# Patient Record
Sex: MALE | Race: WHITE | NOT HISPANIC OR LATINO | Employment: PART TIME | URBAN - METROPOLITAN AREA
[De-identification: names, ages, dates, MRNs, and addresses within clinical notes are randomized per-mention and may not be internally consistent; named-entity substitution may affect disease eponyms.]

---

## 2017-02-27 ENCOUNTER — OFFICE VISIT (OUTPATIENT)
Dept: MEDICAL GROUP | Facility: MEDICAL CENTER | Age: 32
End: 2017-02-27
Attending: FAMILY MEDICINE
Payer: COMMERCIAL

## 2017-02-27 VITALS
HEART RATE: 88 BPM | BODY MASS INDEX: 26.37 KG/M2 | OXYGEN SATURATION: 96 % | SYSTOLIC BLOOD PRESSURE: 110 MMHG | RESPIRATION RATE: 16 BRPM | HEIGHT: 73 IN | TEMPERATURE: 97.6 F | WEIGHT: 199 LBS | DIASTOLIC BLOOD PRESSURE: 64 MMHG

## 2017-02-27 DIAGNOSIS — H93.93: ICD-10-CM

## 2017-02-27 PROCEDURE — 99213 OFFICE O/P EST LOW 20 MIN: CPT | Performed by: FAMILY MEDICINE

## 2017-02-27 PROCEDURE — 99212 OFFICE O/P EST SF 10 MIN: CPT | Mod: 25 | Performed by: FAMILY MEDICINE

## 2017-02-27 PROCEDURE — 69209 REMOVE IMPACTED EAR WAX UNI: CPT | Performed by: FAMILY MEDICINE

## 2017-02-27 NOTE — MR AVS SNAPSHOT
"        Parmjit Thomas   2017 4:10 PM   Office Visit   MRN: [id number]    [facility]   Dept Phone:  [phone]    Description:  Male : 1985   Provider:  London Nichole M.D.           Reason for Visit     Cerumen Impaction bilateral      Allergies as of 2017     Allergen Noted Reactions    Sulfa Drugs 2015   Rash    Pt states he breaks out into a rash    Abilify 10/16/2015       Severe anxiety and stimulation    Sulfa Drugs 2016         You were diagnosed with     Ear disorder, bilateral   [[id number]]         Vital Signs     Blood Pressure Pulse Temperature Respirations Height Weight    110/64 mmHg 88 36.4 °C (97.6 °F) 16 1.854 m (6' 1\") 90.266 kg (199 lb)    Body Mass Index Oxygen Saturation Smoking Status             26.26 kg/m2 96% Never Smoker          Basic Information     Date Of Birth Sex Race Ethnicity Preferred Language    1985 Male White Non- English      Problem List              ICD-10-CM Priority Class Noted - Resolved    History of tear of ACL (anterior cruciate ligament) Z87.828   2015 - Present    ADD (attention deficit disorder) F98.8   2015 - Present    OCD (obsessive compulsive disorder) F42.9   2015 - Present    Severe depression (CMS-HCC) F32.2   2015 - Present    Left knee pain M25.562   2015 - Present    History of methamphetamine abuse Z87.898   2015 - Present    Recurrent nephrolithiasis N20.0   2015 - Present    Anxiety F41.9   10/16/2015 - Present    Right ankle sprain S93.401A   2016 - Present    Closed fracture of sternum S22.20XA   2016 - Present    Closed compression fracture of L5 lumbar vertebra (CMS-HCC) S32.050A   3/10/2016 - Present      Health Maintenance        Date Due Completion Dates    IMM DTaP/Tdap/Td Vaccine (1 - Tdap) 10/7/2005 ---    IMM INFLUENZA (1) 2016 ---            Current Immunizations     No immunizations on file.      Below and/or attached are " the medications your provider expects you to take. Review all of your home medications and newly ordered medications with your provider and/or pharmacist. Follow medication instructions as directed by your provider and/or pharmacist. Please keep your medication list with you and share with your provider. Update the information when medications are discontinued, doses are changed, or new medications (including over-the-counter products) are added; and carry medication information at all times in the event of emergency situations     Allergies:  SULFA DRUGS - Rash     ABILIFY - (reactions not documented)     SULFA DRUGS - (reactions not documented)               Medications  Valid as of: February 27, 2017 -  4:47 PM    Generic Name Brand Name Tablet Size Instructions for use    BuPROPion HCl (TABLET SR 24 HR) WELLBUTRIN  MG Take 1 Tab by mouth every morning.        ClonazePAM (Tab) KLONOPIN 1 MG Take 1 Tab by mouth 2 times a day as needed.        Diclofenac Sodium (Tablet Delayed Response) VOLTAREN 75 MG TAKE 1 TAB BY MOUTH 2 TIMES A DAY.        Gabapentin (Cap) NEURONTIN 300 MG Take 600 mg by mouth 2 Times a Day.        Gabapentin (Tab) NEURONTIN 600 MG Take 1 Tab by mouth 2 times a day.        OxyCODONE HCl (Tab) ROXICODONE 5 MG Take 1 Tab by mouth every four hours as needed for Severe Pain.        .                 Medicines prescribed today were sent to:     [id number] [place] - [address] [facility].    [address] [place] [address]    Phone: [phone] Fax: [phone]    Open 24 Hours?: No    [id number] [place] - [address] [address]    Phone: [phone] Fax: [phone]    Open 24 Hours?: No      Medication refill instructions:       If your prescription bottle indicates you have medication refills left, it is not necessary to call your provider’s office. Please contact your pharmacy and they will refill your  medication.    If your prescription bottle indicates you do not have any refills left, you may request refills at any time through one of the following ways: The online [employer] system (except Urgent Care), by calling your provider’s office, or by asking your pharmacy to contact your provider’s office with a refill request. Medication refills are processed only during regular business hours and may not be available until the next business day. Your provider may request additional information or to have a follow-up visit with you prior to refilling your medication.   *Please Note: Medication refills are assigned a new Rx number when refilled electronically. Your pharmacy may indicate that no refills were authorized even though a new prescription for the same medication is available at the pharmacy. Please request the medicine by name with the pharmacy before contacting your provider for a refill.           [facility] Access Code: [email or web address]  Expires: 3/29/2017  4:47 PM    [facility]  A secure, online tool to manage your health information     [employer]® is a secure, online tool that connects you to your personalized health information from the privacy of your home -- day or night - making it very easy for you to manage your healthcare. Once the activation process is completed, you can even access your medical information using the [facility] axel, which is available for free in the Apple Axel store or Google Play store.     [facility] provides the following levels of access (as shown below):   My Chart Features   [facility] Doctor [facility]  Specialists [facility]  Urgent  Care [facility]  Primary Care  Doctor   Email your healthcare team securely and privately 24/7 X X X    Manage appointments: schedule your next appointment; view details of past/upcoming appointments X      Request prescription refills. X      View recent personal medical records, including lab and immunizations X X X X   View health  record, including health history, allergies, medications X X X X   Read reports about your outpatient visits, procedures, consult and ER notes X X X X   See your discharge summary, which is a recap of your hospital and/or ER visit that includes your diagnosis, lab results, and care plan. X X       How to register for [employer]:  1. Go to  [email or web address].  2. Click on the Sign Up Now box, which takes you to the New Member Sign Up page. You will need to provide the following information:  a. Enter your [employer] Access Code exactly as it appears at the top of this page. (You will not need to use this code after you’ve completed the sign-up process. If you do not sign up before the expiration date, you must request a new code.)   b. Enter your date of birth.   c. Enter your home email address.   d. Click Submit, and follow the next screen’s instructions.  3. Create a [employer] ID. This will be your [employer] login ID and cannot be changed, so think of one that is secure and easy to remember.  4. Create a [employer] password. You can change your password at any time.  5. Enter your Password Reset Question and Answer. This can be used at a later time if you forget your password.   6. Enter your e-mail address. This allows you to receive e-mail notifications when new information is available in [employer].  7. Click Sign Up. You can now view your health information.    For assistance activating your [employer] account, call [phone]

## 2017-02-28 NOTE — PROGRESS NOTES
"Subjective:      Parmjit Thomas is a 31 y.o. male who presents with Cerumen Impaction            Cerumen Impaction     1. Ear fullness-patient parts 1-2 month history of fullness and mild discomfort in both ears. He denies nasal congestion or discharge, sore throat, headache    ROS negative for ear drainage, history of past cerumen impaction, loss of balance       Objective:     /64 mmHg  Pulse 88  Temp(Src) 36.4 °C (97.6 °F)  Resp 16  Ht 1.854 m (6' 1\")  Wt 90.266 kg (199 lb)  BMI 26.26 kg/m2  SpO2 96%     Physical Exam   General- alert,cooperative patient in no acute distress  Ears-moderate amount of wax on the floor of the left ear canal obscuring about half of otherwise normal-appearing TM. Mild amount of wax at the base of the right TM otherwise clear canal. Both sites are irrigated with resolution of patient's symptoms. Left side now appears completely clear. Small amount of wax still noted at the right eardrum.  Nares- clear, pink, moist mucosa without bleeding. No purulent nasal DC  Orophx.- lips normal. Clear, pink, moist mucosa without redness or exudate. Tongue is midline  Chest-Normal to auscultation and percussion, movement is symmetric. Nontender to palpation.              Assessment/Plan:     1. Ear disorder, bilateral  Plan: 1. Patient is discouraged from using any type of a small object to trying pool wax out of either ear blindly  2. Patient is advised to return to clinic if he feels that similar symptoms are starting to develop in the future        "

## 2017-10-17 ENCOUNTER — OFFICE VISIT (OUTPATIENT)
Dept: MEDICAL GROUP | Facility: MEDICAL CENTER | Age: 32
End: 2017-10-17
Payer: COMMERCIAL

## 2017-10-17 VITALS
DIASTOLIC BLOOD PRESSURE: 70 MMHG | SYSTOLIC BLOOD PRESSURE: 118 MMHG | WEIGHT: 209 LBS | BODY MASS INDEX: 27.7 KG/M2 | RESPIRATION RATE: 18 BRPM | TEMPERATURE: 97.9 F | OXYGEN SATURATION: 95 % | HEART RATE: 74 BPM | HEIGHT: 73 IN

## 2017-10-17 DIAGNOSIS — J34.89 RHINORRHEA: ICD-10-CM

## 2017-10-17 DIAGNOSIS — J30.1 ACUTE SEASONAL ALLERGIC RHINITIS DUE TO POLLEN: ICD-10-CM

## 2017-10-17 PROCEDURE — 99214 OFFICE O/P EST MOD 30 MIN: CPT | Performed by: PHYSICIAN ASSISTANT

## 2017-10-17 RX ORDER — AZELASTINE 1 MG/ML
1 SPRAY, METERED NASAL 2 TIMES DAILY
Qty: 30 ML | Refills: 3 | Status: SHIPPED | OUTPATIENT
Start: 2017-10-17 | End: 2018-01-28

## 2017-10-17 RX ORDER — PREDNISONE 20 MG/1
TABLET ORAL
Qty: 15 TAB | Refills: 0 | Status: SHIPPED | OUTPATIENT
Start: 2017-10-17 | End: 2017-10-17 | Stop reason: SDUPTHER

## 2017-10-17 RX ORDER — PREDNISONE 20 MG/1
TABLET ORAL
Qty: 15 TAB | Refills: 0 | Status: SHIPPED | OUTPATIENT
Start: 2017-10-17 | End: 2017-11-14

## 2017-10-17 RX ORDER — AZELASTINE 1 MG/ML
1 SPRAY, METERED NASAL 2 TIMES DAILY
Qty: 30 ML | Refills: 3 | Status: SHIPPED | OUTPATIENT
Start: 2017-10-17 | End: 2017-10-17 | Stop reason: SDUPTHER

## 2017-10-17 NOTE — ASSESSMENT & PLAN NOTE
This is a 32-year-old male complains of an approximate one-month history of sinus congestion and draining. Sometimes it is worse in the morning sometimes it happens all day long. He states he's been sneezing all the time. Eyes bother him somewhat. He works in construction. He has tried every over-the-counter allergy product. Is currently on Allegra. Has tried Flonase. Uses Afrin routinely. Last year allergies weren't as bad. Denies any fevers. No shortness of breath or chest pain. No known sick contacts.

## 2017-10-17 NOTE — PROGRESS NOTES
"Subjective:   Parmjit Thomas is a 32 y.o. male here today for runny nose and sneezing for approximately one month.    Rhinorrhea  This is a 32-year-old male complains of an approximate one-month history of sinus congestion and draining. Sometimes it is worse in the morning sometimes it happens all day long. He states he's been sneezing all the time. Eyes bother him somewhat. He works in construction. He has tried every over-the-counter allergy product. Is currently on Allegra. Has tried Flonase. Uses Afrin routinely. Last year allergies weren't as bad. Denies any fevers. No shortness of breath or chest pain. No known sick contacts.       Current medicines (including changes today)  Current Outpatient Prescriptions   Medication Sig Dispense Refill   • azelastine (ASTELIN) 137 MCG/SPRAY nasal spray [place] 1 Spray in nose 2 times a day. 30 mL 3   • predniSONE (DELTASONE) 20 MG Tab Take three tablets daily x 5 days. 15 Tab 0     No current facility-administered medications for this visit.      He  has a past medical history of ADD (attention deficit disorder) (2006); Depression (age 15); Depression; Kidney stone (2005); and OCD (obsessive compulsive disorder) (2014).    ROS   No chest pain, no shortness of breath, no abdominal pain and all other systems were reviewed and are negative.       Objective:     Blood pressure 118/70, pulse 74, temperature 36.6 °C (97.9 °F), resp. rate 18, height 1.854 m (6' 1\"), weight 94.8 kg (209 lb), SpO2 95 %. Body mass index is 27.57 kg/m².   Physical Exam:  Constitutional: Alert, no distress.  Skin: Warm, dry, good turgor, no rashes in visible areas.  Eye: Equal, round and reactive, conjunctiva clear, lids normal.  ENMT: Lips without lesions, good dentition, oropharynx clear.Nasal passages with swollen erythematous turbinates. Associated clear mucus. TMs bilaterally are clear.  Neck: Trachea midline, no masses.   Lymph: No cervical or supraclavicular " lymphadenopathy  Respiratory: Unlabored respiratory effort, lungs clear to auscultation, no wheezes, no ronchi.  Cardiovascular: Normal S1, S2, no murmur, no edema.  Abdomen: Soft, non-tender, no masses.  Psych: Alert and oriented x3, normal affect and mood.        Assessment and Plan:   The following treatment plan was discussed    1. Rhinorrhea  Acute, new onset condition. Advised symptoms appear to be secondary to allergic rhinitis. Advised to wash hands routinely and not touch his face. May continue Allegra daily. Use Flonase one spray per nostril twice a day. Provided a course of prednisone for 5 days to see if it suppresses his spots. Also provided Astelin one spray in nose twice a day as well. Follow-up or contact me with any continuation of symptoms. May refer to allergy or provide Kenalog injection.  - azelastine (ASTELIN) 137 MCG/SPRAY nasal spray; Spray 1 Spray in nose 2 times a day.  Dispense: 30 mL; Refill: 3  - predniSONE (DELTASONE) 20 MG Tab; Take three tablets daily x 5 days.  Dispense: 15 Tab; Refill: 0    2. Acute seasonal allergic rhinitis due to pollen  Acute, new onset condition. Please see above.  - azelastine (ASTELIN) 137 MCG/SPRAY nasal spray; Spray 1 Spray in nose 2 times a day.  Dispense: 30 mL; Refill: 3  - predniSONE (DELTASONE) 20 MG Tab; Take three tablets daily x 5 days.  Dispense: 15 Tab; Refill: 0      Followup: Return if symptoms worsen or fail to improve.    Please note that this dictation was created using voice recognition software. I have made every reasonable attempt to correct obvious errors, but I expect that there are errors of grammar and possibly content that I did not discover before finalizing the note.

## 2017-11-14 ENCOUNTER — OFFICE VISIT (OUTPATIENT)
Dept: MEDICAL GROUP | Facility: MEDICAL CENTER | Age: 32
End: 2017-11-14
Payer: COMMERCIAL

## 2017-11-14 VITALS
RESPIRATION RATE: 16 BRPM | BODY MASS INDEX: 28.43 KG/M2 | DIASTOLIC BLOOD PRESSURE: 72 MMHG | HEIGHT: 73 IN | OXYGEN SATURATION: 93 % | WEIGHT: 214.51 LBS | SYSTOLIC BLOOD PRESSURE: 132 MMHG | TEMPERATURE: 98.9 F | HEART RATE: 80 BPM

## 2017-11-14 DIAGNOSIS — Z71.1 CONCERN ABOUT STD IN MALE WITHOUT DIAGNOSIS: ICD-10-CM

## 2017-11-14 DIAGNOSIS — N46.9 MALE FERTILITY PROBLEM: ICD-10-CM

## 2017-11-14 DIAGNOSIS — R21 RASH OF FACE: ICD-10-CM

## 2017-11-14 DIAGNOSIS — L60.8 NAIL DEFORMITY: ICD-10-CM

## 2017-11-14 PROCEDURE — 99214 OFFICE O/P EST MOD 30 MIN: CPT | Performed by: PHYSICIAN ASSISTANT

## 2017-11-14 RX ORDER — TRIAMCINOLONE ACETONIDE 1 MG/G
1 CREAM TOPICAL 2 TIMES DAILY
Qty: 60 G | Refills: 0 | Status: SHIPPED | OUTPATIENT
Start: 2017-11-14 | End: 2018-01-28

## 2017-11-14 RX ORDER — CICLOPIROX 80 MG/ML
SOLUTION TOPICAL
Qty: 6.6 ML | Refills: 1 | Status: SHIPPED | OUTPATIENT
Start: 2017-11-14 | End: 2018-10-15

## 2017-11-15 NOTE — PROGRESS NOTES
Subjective:   Parmjit Thomas is a 32 y.o. male here today for discussion the rash on his face and other issues.    Rash of face  This is a 32-year-old male who is here today for several complaints. I saw him last time for severe allergies. I provided him a steroid course and symptoms have improved drastically. He is also using a nasal spray. Complains of a rash on his face. He told me about it through my chart but I told her take over-the-counter steroid cream as well as use a fungal clean. He is tried both without any relief. Rashes under the left eye as well as above the right eye also nasolabial folds. Complains of slight itching.    Male fertility problem  He is currently back with his girlfriend who is 38 years of age and had 2 children. They are almost out of the house. He is not interested in children currently. Wants to discuss about a vasectomy. Is not even sure if he can have children as is never gotten anyone pregnant. Wants to check his fertility status.    Nail deformity  Complains of his right great toe with a chronic history of a nail deformity. When he was in young child there was trauma to that nail and half of it was removed. He complains now of pain of the toenails especially when wearing steel toe boots. Has used over-the-counter fungal removal without any success.    Concern about STD in male without diagnosis  Wants an STD profile. Asymptomatic today.         Current medicines (including changes today)  Current Outpatient Prescriptions   Medication Sig Dispense Refill   • triamcinolone acetonide (KENALOG) 0.1 % Cream Apply 1 Application to affected area(s) 2 times a day. 60 g 0   • ciclopirox (PENLAC) 8 % solution Apply to nail and surrounding tissue for 7 days then remove with alcohol. 6.6 mL 1   • azelastine (ASTELIN) 137 MCG/SPRAY nasal spray [place] 1 Spray in nose 2 times a day. 30 mL 3     No current facility-administered medications for this visit.      He  has a past medical  "history of ADD (attention deficit disorder) (2006); Depression (age 15); Depression; Kidney stone (2005); and OCD (obsessive compulsive disorder) (2014).    ROS   No chest pain, no shortness of breath, no abdominal pain and all other systems were reviewed and are negative.       Objective:     Blood pressure 132/72, pulse 80, temperature 37.2 °C (98.9 °F), resp. rate 16, height 1.854 m (6' 1\"), weight 97.3 kg (214 lb 8.1 oz), SpO2 93 %. Body mass index is 28.3 kg/m².   Physical Exam:  Constitutional: Alert, no distress.  Skin: Warm, dry, good turgor, erythematous raised lesions which appear to be spotted in various aspects of the face. Right toenail with some yellowish markings as well as some slight thickness.  Eye: Equal, round and reactive, conjunctiva clear, lids normal.  ENMT: Lips without lesions, good dentition, oropharynx clear.  Neck: Trachea midline, no masses.   Lymph: No cervical or supraclavicular lymphadenopathy  Respiratory: Unlabored respiratory effort, lungs clear to auscultation, no wheezes, no ronchi.  Cardiovascular: Normal S1, S2, no murmur, no edema.  Abdomen: Soft, non-tender, no masses.  Psych: Alert and oriented x3, normal affect and mood.        Assessment and Plan:   The following treatment plan was discussed    1. Rash of face  New-onset condition. Could be secondary to eczema giving his history of allergies. Provided triamcinolone as directed by sparingly twice a day for up to 5-7 days.  - triamcinolone acetonide (KENALOG) 0.1 % Cream; Apply 1 Application to affected area(s) 2 times a day.  Dispense: 60 g; Refill: 0    2. Concern about STD in male without diagnosis  Order labs. He will be contacted with the results.  - CHLAMYDIA TRACHOMATIS, NEISSERIA GONORRHEA  - HEPATITIS PANEL ACUTE (4 COMPONENTS)  - HSV II SPECIFIC IGG AB; Future  - HIV ANTIBODIES; Future    3. Male fertility problem  Orders from count. Status unknown regarding his fertility.  - SPERM COUNT, TOTAL    4. Nail " deformity  Chronic condition. Unlikely secondary to fungus but we'll try Penlac for a week or 2 to see if it improves. Contact me with any concerns to my chart.  - ciclopirox (PENLAC) 8 % solution; Apply to nail and surrounding tissue for 7 days then remove with alcohol.  Dispense: 6.6 mL; Refill: 1      Followup: Return if symptoms worsen or fail to improve.    Please note that this dictation was created using voice recognition software. I have made every reasonable attempt to correct obvious errors, but I expect that there are errors of grammar and possibly content that I did not discover before finalizing the note.

## 2017-11-15 NOTE — ASSESSMENT & PLAN NOTE
This is a 32-year-old male who is here today for several complaints. I saw him last time for severe allergies. I provided him a steroid course and symptoms have improved drastically. He is also using a nasal spray. Complains of a rash on his face. He told me about it through my chart but I told her take over-the-counter steroid cream as well as use a fungal clean. He is tried both without any relief. Rashes under the left eye as well as above the right eye also nasolabial folds. Complains of slight itching.

## 2017-11-15 NOTE — ASSESSMENT & PLAN NOTE
Complains of his right great toe with a chronic history of a nail deformity. When he was in young child there was trauma to that nail and half of it was removed. He complains now of pain of the toenails especially when wearing steel toe boots. Has used over-the-counter fungal removal without any success.

## 2017-11-15 NOTE — ASSESSMENT & PLAN NOTE
He is currently back with his girlfriend who is 38 years of age and had 2 children. They are almost out of the house. He is not interested in children currently. Wants to discuss about a vasectomy. Is not even sure if he can have children as is never gotten anyone pregnant. Wants to check his fertility status.

## 2017-12-08 DIAGNOSIS — N46.9 MALE FERTILITY PROBLEM: ICD-10-CM

## 2017-12-21 ENCOUNTER — TELEPHONE (OUTPATIENT)
Dept: MEDICAL GROUP | Facility: MEDICAL CENTER | Age: 32
End: 2017-12-21

## 2017-12-22 NOTE — TELEPHONE ENCOUNTER
Phone Number Called: [phone] (home)     Message: Notified patient of the results and he stated understanding.    Left Message for patient to call back: no

## 2017-12-22 NOTE — TELEPHONE ENCOUNTER
----- Message from Parmjit Pat P.A.-C. sent at 12/21/2017  4:20 PM PST -----  Please contact Parmjit.  Labs performed were negative.  Thank you.    Parmjit

## 2018-01-28 RX ORDER — METRONIDAZOLE 7.5 MG/G
1 GEL TOPICAL 2 TIMES DAILY
Qty: 1 TUBE | Refills: 0 | Status: SHIPPED | OUTPATIENT
Start: 2018-01-28 | End: 2018-10-15

## 2018-03-26 DIAGNOSIS — M54.9 SPINE PAIN: ICD-10-CM

## 2018-07-27 ENCOUNTER — OFFICE VISIT (OUTPATIENT)
Dept: URGENT CARE | Facility: PHYSICIAN GROUP | Age: 33
End: 2018-07-27

## 2018-07-27 VITALS
SYSTOLIC BLOOD PRESSURE: 126 MMHG | DIASTOLIC BLOOD PRESSURE: 74 MMHG | TEMPERATURE: 98.6 F | WEIGHT: 205 LBS | HEART RATE: 87 BPM | BODY MASS INDEX: 27.17 KG/M2 | RESPIRATION RATE: 16 BRPM | HEIGHT: 73 IN | OXYGEN SATURATION: 96 %

## 2018-07-27 DIAGNOSIS — R30.0 DYSURIA: ICD-10-CM

## 2018-07-27 DIAGNOSIS — R36.9 PENILE DISCHARGE: ICD-10-CM

## 2018-07-27 DIAGNOSIS — Z20.2 POSSIBLE EXPOSURE TO STD: ICD-10-CM

## 2018-07-27 PROCEDURE — 99214 OFFICE O/P EST MOD 30 MIN: CPT | Mod: 25 | Performed by: NURSE PRACTITIONER

## 2018-07-27 PROCEDURE — 96372 THER/PROPH/DIAG INJ SC/IM: CPT | Performed by: NURSE PRACTITIONER

## 2018-07-27 RX ORDER — AZITHROMYCIN 500 MG/1
1000 TABLET, FILM COATED ORAL ONCE
Qty: 2 TAB | Refills: 0 | Status: SHIPPED | OUTPATIENT
Start: 2018-07-27 | End: 2018-07-27

## 2018-07-28 NOTE — PROGRESS NOTES
Chief Complaint   Patient presents with   • Dysuria     and discharge x3 days       HISTORY OF PRESENT ILLNESS: Patient is a 32 y.o. male who presents to urgent care today with concerns of STD. States for the past three days he has had dysuria and penile discharge. He denies associated hematuria, urgency, frequency, testicular pain or swelling. Denies any fever or chills or malaise. Admits to history of gonorrhea in 2015, states this feels exactly similar. He is sexually active with concerned he may have contracted STI from partner, she is denying symptoms. He has not taken any medication for symptom relie    Patient Active Problem List    Diagnosis Date Noted   • Rash of face 11/14/2017   • Concern about STD in male without diagnosis 11/14/2017   • Male fertility problem 11/14/2017   • Nail deformity 11/14/2017   • Rhinorrhea 10/17/2017   • Acute seasonal allergic rhinitis due to pollen 10/17/2017   • Recurrent nephrolithiasis 09/11/2015   • History of tear of ACL (anterior cruciate ligament) 02/26/2015   • Left knee pain 02/26/2015   • History of methamphetamine abuse 02/26/2015       Allergies:Sulfa drugs; Abilify; and Sulfa drugs    Current Outpatient Prescriptions Ordered in [facility]   Medication Sig Dispense Refill   • azithromycin (ZITHROMAX) 500 MG tablet Take 2 Tabs by mouth Once for 1 dose. 2 Tab 0   • metronidazole (METROGEL) 0.75 % gel Apply 1 Application to affected area(s) 2 times a day. 1 Tube 0   • ciclopirox (PENLAC) 8 % solution Apply to nail and surrounding tissue for 7 days then remove with alcohol. 6.6 mL 1     Current Facility-Administered Medications Ordered in Epic   Medication Dose Route Frequency Provider Last Rate Last Dose   • cefTRIAXone (ROCEPHIN) 250 mg, lidocaine (XYLOCAINE) 1 % 0.9 mL for IM use  250 mg Intramuscular Once Karime Clark A.P.R.NLaya           Past Medical History:   Diagnosis Date   • ADD (attention deficit disorder) 2006   • Depression age 15   • Depression    • Kidney stone  "2005    lithotripsy   • OCD (obsessive compulsive disorder) 2014    seeks perfection       Social History   Substance Use Topics   • Smoking status: Never Smoker   • Smokeless tobacco: Never Used   • Alcohol use 0.0 oz/week      Comment: Occassionally       Family Status   Relation Status   • MGMo (Not Specified)   • MGFa (Not Specified)   • Neg Hx (Not Specified)     Family History   Problem Relation Age of Onset   • Stroke Maternal Grandmother    • Heart Disease Maternal Grandfather    • Alcohol/Drug Maternal Grandfather    • Cancer Neg Hx    • Diabetes Neg Hx        ROS:  Review of Systems   Constitutional: Negative for fever, chills, weight loss, malaise, and fatigue.   HENT: Negative for ear pain, nosebleeds, congestion, sore throat and neck pain.    Eyes: Negative for vision changes.   Neuro: Negative for headache, sensory changes, weakness, seizure, LOC.   Cardiovascular: Negative for chest pain, palpitations, orthopnea and leg swelling.   Respiratory: Negative for cough, sputum production, shortness of breath and wheezing.   Gastrointestinal: Negative for abdominal pain, nausea, vomiting or diarrhea.   Genitourinary: Positive for discharge, dysuria. Negative for hematuria, urgency and frequency. Negative for testicular pain, swelling.   Musculoskeletal: Negative for falls, neck pain, back pain, joint pain, myalgias.   Skin: Negative for rash, diaphoresis.     Exam:  Blood pressure 126/74, pulse 87, temperature 37 °C (98.6 °F), resp. rate 16, height 1.854 m (6' 1\"), weight 93 kg (205 lb), SpO2 96 %.  General: well-nourished, well-developed male in NAD  Head: normocephalic, atraumatic  Eyes: PERRLA, no conjunctival injection, acuity grossly intact, lids normal.  Ears: normal shape and symmetry, gross auditory acuity is intact.  Nose: symmetrical without tenderness, no discharge.  Mouth/Throat: reasonable hygiene, no erythema, exudates or tonsillar enlargement.  Neck: no masses, range of motion within normal " limits, no tracheal deviation. No obvious thyroid enlargement.   Lymph: no cervical adenopathy. No supraclavicular adenopathy.   Neuro: alert and oriented. Cranial nerves 1-12 grossly intact. No sensory deficit.   Cardiovascular: regular rate and rhythm. No edema.  Pulmonary: no distress. Chest is symmetrical with respiration, no wheezes, crackles, or rhonchi.   Abdomen: soft, non-tender, no guarding, no hepatosplenomegaly.   Musculoskeletal: no clubbing, appropriate muscle tone, gait is stable.  Skin: warm, dry, intact, no clubbing, no cyanosis, no rashes.   Psych: appropriate mood, affect, judgement.         Assessment/Plan:    1. Penile discharge  2. Dysuria  3. Possible exposure to STD        The patient is kindly refusing urine culture or STI screening. States his symptoms feel exactly similar to previous STI. Therefore, will treat proactively with azithromycin and rocephin. Tolerated rocephin well. Safe sex practices discussed.   Supportive care, differential diagnoses, and indications for immediate follow-up discussed with patient.   Pathogenesis of diagnosis discussed including typical length and natural progression.   Instructed to return to clinic or nearest emergency department for any change in condition, further concerns, or worsening of symptoms.  Patient states understanding of the plan of care and discharge instructions.  Instructed to make an appointment, for follow up, with his primary care provider.        Please note that this dictation was created using voice recognition software. I have made every reasonable attempt to correct obvious errors, but I expect that there are errors of grammar and possibly content that I did not discover before finalizing the note.      PERCY Santana.

## 2018-10-15 ENCOUNTER — HOSPITAL ENCOUNTER (EMERGENCY)
Facility: MEDICAL CENTER | Age: 33
End: 2018-10-16
Attending: EMERGENCY MEDICINE
Payer: MEDICAID

## 2018-10-15 DIAGNOSIS — N13.30 HYDRONEPHROSIS, UNSPECIFIED HYDRONEPHROSIS TYPE: ICD-10-CM

## 2018-10-15 DIAGNOSIS — R10.9 FLANK PAIN: ICD-10-CM

## 2018-10-15 DIAGNOSIS — M79.652 ACUTE PAIN OF LEFT THIGH: ICD-10-CM

## 2018-10-15 PROCEDURE — 99284 EMERGENCY DEPT VISIT MOD MDM: CPT

## 2018-10-16 ENCOUNTER — HOSPITAL ENCOUNTER (EMERGENCY)
Facility: MEDICAL CENTER | Age: 33
End: 2018-10-16
Attending: EMERGENCY MEDICINE
Payer: MEDICAID

## 2018-10-16 ENCOUNTER — APPOINTMENT (OUTPATIENT)
Dept: RADIOLOGY | Facility: MEDICAL CENTER | Age: 33
End: 2018-10-16
Attending: EMERGENCY MEDICINE
Payer: MEDICAID

## 2018-10-16 VITALS
HEART RATE: 81 BPM | SYSTOLIC BLOOD PRESSURE: 115 MMHG | WEIGHT: 202.82 LBS | BODY MASS INDEX: 26.88 KG/M2 | RESPIRATION RATE: 18 BRPM | DIASTOLIC BLOOD PRESSURE: 69 MMHG | HEIGHT: 73 IN | OXYGEN SATURATION: 95 % | TEMPERATURE: 97.6 F

## 2018-10-16 VITALS
HEIGHT: 73 IN | WEIGHT: 202.16 LBS | BODY MASS INDEX: 26.79 KG/M2 | OXYGEN SATURATION: 99 % | HEART RATE: 84 BPM | DIASTOLIC BLOOD PRESSURE: 75 MMHG | RESPIRATION RATE: 19 BRPM | TEMPERATURE: 99.1 F | SYSTOLIC BLOOD PRESSURE: 131 MMHG

## 2018-10-16 DIAGNOSIS — M79.605 LEFT LEG PAIN: ICD-10-CM

## 2018-10-16 DIAGNOSIS — M71.22 SYNOVIAL CYST OF LEFT POPLITEAL SPACE: ICD-10-CM

## 2018-10-16 LAB
ANION GAP SERPL CALC-SCNC: 7 MMOL/L (ref 0–11.9)
APPEARANCE UR: CLEAR
BASOPHILS # BLD AUTO: 0.5 % (ref 0–1.8)
BASOPHILS # BLD: 0.03 K/UL (ref 0–0.12)
BILIRUB UR QL STRIP.AUTO: NEGATIVE
BNP SERPL-MCNC: 20 PG/ML (ref 0–100)
BUN SERPL-MCNC: 13 MG/DL (ref 8–22)
CALCIUM SERPL-MCNC: 9.3 MG/DL (ref 8.4–10.2)
CHLORIDE SERPL-SCNC: 101 MMOL/L (ref 96–112)
CO2 SERPL-SCNC: 26 MMOL/L (ref 20–33)
COLOR UR: YELLOW
CREAT SERPL-MCNC: 1.05 MG/DL (ref 0.5–1.4)
EOSINOPHIL # BLD AUTO: 0.06 K/UL (ref 0–0.51)
EOSINOPHIL NFR BLD: 0.9 % (ref 0–6.9)
ERYTHROCYTE [DISTWIDTH] IN BLOOD BY AUTOMATED COUNT: 40.5 FL (ref 35.9–50)
GLUCOSE SERPL-MCNC: 98 MG/DL (ref 65–99)
GLUCOSE UR STRIP.AUTO-MCNC: NEGATIVE MG/DL
HCT VFR BLD AUTO: 45.2 % (ref 42–52)
HGB BLD-MCNC: 16.1 G/DL (ref 14–18)
IMM GRANULOCYTES # BLD AUTO: 0.01 K/UL (ref 0–0.11)
IMM GRANULOCYTES NFR BLD AUTO: 0.2 % (ref 0–0.9)
KETONES UR STRIP.AUTO-MCNC: NEGATIVE MG/DL
LEUKOCYTE ESTERASE UR QL STRIP.AUTO: NEGATIVE
LYMPHOCYTES # BLD AUTO: 1.94 K/UL (ref 1–4.8)
LYMPHOCYTES NFR BLD: 29.2 % (ref 22–41)
MCH RBC QN AUTO: 30.8 PG (ref 27–33)
MCHC RBC AUTO-ENTMCNC: 35.6 G/DL (ref 33.7–35.3)
MCV RBC AUTO: 86.6 FL (ref 81.4–97.8)
MICRO URNS: NORMAL
MONOCYTES # BLD AUTO: 0.71 K/UL (ref 0–0.85)
MONOCYTES NFR BLD AUTO: 10.7 % (ref 0–13.4)
NEUTROPHILS # BLD AUTO: 3.9 K/UL (ref 1.82–7.42)
NEUTROPHILS NFR BLD: 58.5 % (ref 44–72)
NITRITE UR QL STRIP.AUTO: NEGATIVE
NRBC # BLD AUTO: 0 K/UL
NRBC BLD-RTO: 0 /100 WBC
PH UR STRIP.AUTO: 7 [PH]
PLATELET # BLD AUTO: 219 K/UL (ref 164–446)
PMV BLD AUTO: 10.3 FL (ref 9–12.9)
POTASSIUM SERPL-SCNC: 3.6 MMOL/L (ref 3.6–5.5)
PROT UR QL STRIP: NEGATIVE MG/DL
RBC # BLD AUTO: 5.22 M/UL (ref 4.7–6.1)
RBC UR QL AUTO: NEGATIVE
SODIUM SERPL-SCNC: 134 MMOL/L (ref 135–145)
SP GR UR STRIP.AUTO: 1.01
TROPONIN I SERPL-MCNC: <0.02 NG/ML (ref 0–0.04)
WBC # BLD AUTO: 6.7 K/UL (ref 4.8–10.8)

## 2018-10-16 PROCEDURE — 36415 COLL VENOUS BLD VENIPUNCTURE: CPT

## 2018-10-16 PROCEDURE — 93971 EXTREMITY STUDY: CPT | Mod: LT

## 2018-10-16 PROCEDURE — 96372 THER/PROPH/DIAG INJ SC/IM: CPT

## 2018-10-16 PROCEDURE — 96374 THER/PROPH/DIAG INJ IV PUSH: CPT

## 2018-10-16 PROCEDURE — 84484 ASSAY OF TROPONIN QUANT: CPT

## 2018-10-16 PROCEDURE — 99284 EMERGENCY DEPT VISIT MOD MDM: CPT

## 2018-10-16 PROCEDURE — 80048 BASIC METABOLIC PNL TOTAL CA: CPT

## 2018-10-16 PROCEDURE — 85025 COMPLETE CBC W/AUTO DIFF WBC: CPT

## 2018-10-16 PROCEDURE — 700111 HCHG RX REV CODE 636 W/ 250 OVERRIDE (IP): Performed by: EMERGENCY MEDICINE

## 2018-10-16 PROCEDURE — 81003 URINALYSIS AUTO W/O SCOPE: CPT

## 2018-10-16 PROCEDURE — 76775 US EXAM ABDO BACK WALL LIM: CPT

## 2018-10-16 PROCEDURE — 700105 HCHG RX REV CODE 258: Performed by: EMERGENCY MEDICINE

## 2018-10-16 PROCEDURE — 83880 ASSAY OF NATRIURETIC PEPTIDE: CPT

## 2018-10-16 RX ORDER — KETOROLAC TROMETHAMINE 30 MG/ML
15 INJECTION, SOLUTION INTRAMUSCULAR; INTRAVENOUS ONCE
Status: COMPLETED | OUTPATIENT
Start: 2018-10-16 | End: 2018-10-16

## 2018-10-16 RX ORDER — KETOROLAC TROMETHAMINE 30 MG/ML
30 INJECTION, SOLUTION INTRAMUSCULAR; INTRAVENOUS ONCE
Status: COMPLETED | OUTPATIENT
Start: 2018-10-16 | End: 2018-10-16

## 2018-10-16 RX ORDER — SODIUM CHLORIDE 9 MG/ML
1000 INJECTION, SOLUTION INTRAVENOUS ONCE
Status: COMPLETED | OUTPATIENT
Start: 2018-10-16 | End: 2018-10-16

## 2018-10-16 RX ADMIN — KETOROLAC TROMETHAMINE 30 MG: 30 INJECTION, SOLUTION INTRAMUSCULAR at 22:31

## 2018-10-16 RX ADMIN — KETOROLAC TROMETHAMINE 15 MG: 30 INJECTION, SOLUTION INTRAMUSCULAR at 00:34

## 2018-10-16 RX ADMIN — SODIUM CHLORIDE 1000 ML: 9 INJECTION, SOLUTION INTRAVENOUS at 00:34

## 2018-10-16 ASSESSMENT — ENCOUNTER SYMPTOMS
FOCAL WEAKNESS: 0
HALLUCINATIONS: 0
BRUISES/BLEEDS EASILY: 0
ABDOMINAL PAIN: 0
BACK PAIN: 0
EYES NEGATIVE: 1
BLOOD IN STOOL: 0
EYE PAIN: 0
CARDIOVASCULAR NEGATIVE: 1
SHORTNESS OF BREATH: 0
FLANK PAIN: 1
CONSTITUTIONAL NEGATIVE: 1
HEADACHES: 0
SEIZURES: 0
WEAKNESS: 0
GASTROINTESTINAL NEGATIVE: 1
NECK PAIN: 0
MYALGIAS: 0
SORE THROAT: 0
FEVER: 0
RESPIRATORY NEGATIVE: 1

## 2018-10-16 ASSESSMENT — PAIN SCALES - GENERAL: PAINLEVEL_OUTOF10: 4

## 2018-10-16 ASSESSMENT — PAIN DESCRIPTION - DESCRIPTORS: DESCRIPTORS: THROBBING

## 2018-10-16 NOTE — ED PROVIDER NOTES
ED Provider Note    CHIEF COMPLAINT  Chief Complaint   Patient presents with   • Knee Pain     left leg       HPI  HPI   33-year-old male with past medical history of nephrolithiasis presents with 2 acute complaints to the emergency department today  Complaint #1:  Pt reports going through heavy stress and losses  Pt reports dehydration due to poor oral intake  Overburdened w/ stress  Patient denies SI or HI  Intermittent b/l flank pain x 3-4 wk  Pt states that his flank pain is felt like prior kidney stones and patient states that he has needed lithotripsy in the past.  Patient denies fevers or dysuria.  Patient denies hematuria.  Patient denies trauma.    Complaint#2:  Pt reports burning pain in L thigh x 2 d.    Describes pain as burning and in upper thigh and intermittent and crampy.  No recent surgeries  No leg swelling   Patient denies rash or trauma.  Patient denies pain of hip or knee or ankle.    REVIEW OF SYSTEMS  Review of Systems   Constitutional: Negative.  Negative for fever.   HENT: Negative.  Negative for ear pain and sore throat.    Eyes: Negative.  Negative for pain.   Respiratory: Negative.  Negative for shortness of breath.    Cardiovascular: Negative.  Negative for chest pain.   Gastrointestinal: Negative.  Negative for abdominal pain and blood in stool.   Genitourinary: Positive for flank pain. Negative for dysuria, frequency, hematuria and urgency.   Musculoskeletal: Negative for back pain, myalgias and neck pain.        Positive left thigh pain.   Skin: Negative.  Negative for rash.   Neurological: Negative for focal weakness, seizures, weakness and headaches.   Endo/Heme/Allergies: Does not bruise/bleed easily.   Psychiatric/Behavioral: Negative for hallucinations and suicidal ideas.   All other systems reviewed and are negative.      PAST MEDICAL HISTORY   has a past medical history of ADD (attention deficit disorder) (2006); Depression (age 15); Depression; Kidney stone (2005); and OCD  "(obsessive compulsive disorder) (2014).    SOCIAL HISTORY  Social History     Social History Main Topics   • Smoking status: Never Smoker   • Smokeless tobacco: Never Used   • Alcohol use 0.0 oz/week      Comment: Occassionally   • Drug use: No      Comment: No meth  Dec 2014   • Sexual activity: Yes     Partners: Female      Comment: single       SURGICAL HISTORY   has a past surgical history that includes acl reconstruction (10/31/13) and radius ulna orif (2010).    CURRENT MEDICATIONS  Home Medications     Reviewed by Bibi Villagran R.N. (Registered Nurse) on 10/15/18 at 2356  Med List Status: Complete   Medication Last Dose Status        Patient Diego Taking any Medications                       ALLERGIES  Allergies   Allergen Reactions   • Sulfa Drugs Rash     Pt states he breaks out into a rash   • Abilify      Severe anxiety and stimulation   • Sulfa Drugs        PHYSICAL EXAM  VITAL SIGNS: /75   Pulse 84   Temp 37.3 °C (99.1 °F)   Resp 19   Ht 1.854 m (6' 1\")   Wt 91.7 kg (202 lb 2.6 oz)   SpO2 99%   BMI 26.67 kg/m²  @GREGORIO[[id number]::@  Pulse ox interpretation: I interpret this pulse ox as normal.    Physical Exam   Constitutional: He is oriented to person, place, and time and well-developed, well-nourished, and in no distress.   HENT:   Head: Normocephalic.   Right Ear: External ear normal.   Left Ear: External ear normal.   Mouth/Throat: No oropharyngeal exudate.   Eyes: Pupils are equal, round, and reactive to light. EOM are normal. No scleral icterus.   Neck: Normal range of motion.   Cardiovascular: Normal rate.    Pulmonary/Chest: Effort normal. No respiratory distress.   Abdominal: He exhibits no distension. There is no tenderness.   Musculoskeletal: Normal range of motion. He exhibits no deformity.   Neurological: He is alert and oriented to person, place, and time. Coordination normal.   Skin: Skin is warm and dry. No rash noted. No erythema.   Psychiatric: Affect and judgment normal. "   Nursing note and vitals reviewed.  No CT or L-spine tenderness palpation step-offs or deformities.  No CVA tenderness palpation.  No appreciable left lower extremity edema or erythema.  No appreciable area of fluctuance.  5 out of 5 strength x4.  Normal flexion and extension present of left lower extremity with full range of motion of knee and ankle.    DIAGNOSTIC STUDIES / PROCEDURES    LABS/EKG  Results for orders placed or performed during the hospital encounter of 10/15/18   CBC WITH DIFFERENTIAL   Result Value Ref Range    WBC 6.7 4.8 - 10.8 K/uL    RBC 5.22 4.70 - 6.10 M/uL    Hemoglobin 16.1 14.0 - 18.0 g/dL    Hematocrit 45.2 42.0 - 52.0 %    MCV 86.6 81.4 - 97.8 fL    MCH 30.8 27.0 - 33.0 pg    MCHC 35.6 (H) 33.7 - 35.3 g/dL    RDW 40.5 35.9 - 50.0 fL    Platelet Count 219 164 - 446 K/uL    MPV 10.3 9.0 - 12.9 fL    Neutrophils-Polys 58.50 44.00 - 72.00 %    Lymphocytes 29.20 22.00 - 41.00 %    Monocytes 10.70 0.00 - 13.40 %    Eosinophils 0.90 0.00 - 6.90 %    Basophils 0.50 0.00 - 1.80 %    Immature Granulocytes 0.20 0.00 - 0.90 %    Nucleated RBC 0.00 /100 WBC    Neutrophils (Absolute) 3.90 1.82 - 7.42 K/uL    Lymphs (Absolute) 1.94 1.00 - 4.80 K/uL    Monos (Absolute) 0.71 0.00 - 0.85 K/uL    Eos (Absolute) 0.06 0.00 - 0.51 K/uL    Baso (Absolute) 0.03 0.00 - 0.12 K/uL    Immature Granulocytes (abs) 0.01 0.00 - 0.11 K/uL    NRBC (Absolute) 0.00 K/uL   BASIC METABOLIC PANEL   Result Value Ref Range    Sodium 134 (L) 135 - 145 mmol/L    Potassium 3.6 3.6 - 5.5 mmol/L    Chloride 101 96 - 112 mmol/L    Co2 26 20 - 33 mmol/L    Glucose 98 65 - 99 mg/dL    Bun 13 8 - 22 mg/dL    Creatinine 1.05 0.50 - 1.40 mg/dL    Calcium 9.3 8.4 - 10.2 mg/dL    Anion Gap 7.0 0.0 - 11.9   URINALYSIS CULTURE, IF INDICATED   Result Value Ref Range    Color Yellow     Character Clear     Specific Gravity 1.010 <1.035    Ph 7.0 5.0 - 8.0    Glucose Negative Negative mg/dL    Ketones Negative Negative mg/dL    Protein Negative  Negative mg/dL    Bilirubin Negative Negative    Nitrite Negative Negative    Leukocyte Esterase Negative Negative    Occult Blood Negative Negative    Micro Urine Req see below    BTYPE NATRIURETIC PEPTIDE   Result Value Ref Range    B Natriuretic Peptide 20 0 - 100 pg/mL   TROPONIN   Result Value Ref Range    Troponin I <0.02 0.00 - 0.04 ng/mL   ESTIMATED GFR   Result Value Ref Range    GFR If African American >60 >60 mL/min/1.73 m 2    GFR If Non African American >60 >60 mL/min/1.73 m 2       RADIOLOGY  US-EXTREMITY VENOUS LOWER UNILAT LEFT   Final Result         1.  No evidence of left lower extremity deep venous thrombosis.   2.  Small fluid collection posterior to the knee, likely small Baker's cyst.      US-RENAL   Final Result         1.  Mild left hydronephrosis.           COURSE & MEDICAL DECISION MAKING  Pertinent Labs & Imaging studies reviewed by me. (See chart for details)    33 y.o. M w/ PMHx of nephrolithiasis p/w flank pain and thigh pain    Differential diagnosis includes but is not limited to:  #flank pain  Mild L hydronephrosis on US  Patient referred to urology appointment with Dr. Sutton given mild left hydronephrosis and prior lithotripsy  No pyelonephritis  Given no concern for significant obstruction from stone plan for outpt urology f/u or immediate return to the emergency department if symptoms worsen  Intravenous fluids administered for patient kept n.p.o. as he may be a potential surgical candidate and reported dehydration with dry mucous membranes.  On repeat evaluation, patient with symptom improvement.  Pt w/ positive fluid response.     #left thigh pain  No hx to suggest fx  No e/o DVT  No e/o cellulitis  No hip or knee pain  Given less than 24 hours of symptoms plan to follow-up with PCP for ongoing evaluation and management of left thigh pain and potential consideration for repeat ultrasound in 1-2 weeks    Patient states that he has felt down over the past couple months  encouraged patient to follow-up with therapist or psychiatrist.  No SI or HI today.  Patient states that he has been prescribed antidepressants in the past and had little benefit encourage patient to reconsider today.    FINAL IMPRESSION  Visit Diagnoses     ICD-10-CM   1. Flank pain R10.9   2. Acute pain of left thigh M79.652   3. Hydronephrosis, unspecified hydronephrosis type N13.30              Electronically signed by: Jesus Alberto Gomez, 10/16/2018 12:04 AM

## 2018-10-17 NOTE — ED TRIAGE NOTES
Pt comes in tonight c/o having bilat leg pains  Was seen last night for same but only L leg evaluated  Now having symptoms to both   Pt believes he has a bld clot in them and feels them moving
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
## 2018-10-17 NOTE — DISCHARGE INSTRUCTIONS
You were seen in the Emergency Department for left leg pain.    Ultrasound was reviewed from last night without evidence of blood clot. You do have a small Baker's cysts which should resolve on its own with rest and anti-inflammatories.    Please use 1,000mg of tylenol or 600mg of ibuprofen every 6 hours as needed for pain.  Rest and elevate the extremity as much as possible.    Please follow up with your primary care physician if symptoms have not improved in one week for repeat ultrasound to fully rule out blood clot.    Return to the Emergency Department with fevers greater than on her 0.4, uncontrolled pain, swelling or redness of the leg, or other concerns.

## 2018-10-17 NOTE — ED NOTES
Pt given written and oral discharge instructions. Pt verbalized understanding of all instructions given. All questions answered. VSS. Pt given f/u instructions and educated on s/s of when to return to the ER. Pt ambulating independently upon time of discharge in good condition.
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
## 2019-04-29 ENCOUNTER — OFFICE VISIT (OUTPATIENT)
Dept: INTERNAL MEDICINE | Facility: MEDICAL CENTER | Age: 34
End: 2019-04-29
Payer: MEDICAID

## 2019-04-29 VITALS
HEART RATE: 77 BPM | BODY MASS INDEX: 26.82 KG/M2 | DIASTOLIC BLOOD PRESSURE: 63 MMHG | HEIGHT: 72 IN | TEMPERATURE: 97.9 F | SYSTOLIC BLOOD PRESSURE: 107 MMHG | WEIGHT: 198 LBS | OXYGEN SATURATION: 95 %

## 2019-04-29 DIAGNOSIS — L60.8 NAIL DEFORMITY: ICD-10-CM

## 2019-04-29 DIAGNOSIS — F41.9 ANXIETY: ICD-10-CM

## 2019-04-29 DIAGNOSIS — A63.0 WARTS, GENITAL: ICD-10-CM

## 2019-04-29 DIAGNOSIS — B35.1 ONYCHOMYCOSIS: ICD-10-CM

## 2019-04-29 DIAGNOSIS — F41.0 PANIC ATTACKS: ICD-10-CM

## 2019-04-29 DIAGNOSIS — F32.A DEPRESSION, UNSPECIFIED DEPRESSION TYPE: ICD-10-CM

## 2019-04-29 PROBLEM — R21 RASH OF FACE: Status: RESOLVED | Noted: 2017-11-14 | Resolved: 2019-04-29

## 2019-04-29 PROBLEM — Z71.1 CONCERN ABOUT STD IN MALE WITHOUT DIAGNOSIS: Status: RESOLVED | Noted: 2017-11-14 | Resolved: 2019-04-29

## 2019-04-29 PROBLEM — N46.9 MALE FERTILITY PROBLEM: Status: RESOLVED | Noted: 2017-11-14 | Resolved: 2019-04-29

## 2019-04-29 PROCEDURE — 99204 OFFICE O/P NEW MOD 45 MIN: CPT | Mod: GC | Performed by: INTERNAL MEDICINE

## 2019-04-29 RX ORDER — PROPRANOLOL HYDROCHLORIDE 40 MG/1
40 TABLET ORAL 3 TIMES DAILY
Qty: 90 TAB | Refills: 2 | Status: SHIPPED | OUTPATIENT
Start: 2019-04-29 | End: 2019-04-30 | Stop reason: SDUPTHER

## 2019-04-29 RX ORDER — BUSPIRONE HYDROCHLORIDE 30 MG/1
30 TABLET ORAL DAILY
Qty: 30 TAB | Refills: 5 | Status: SHIPPED | OUTPATIENT
Start: 2019-04-29 | End: 2019-04-30

## 2019-04-29 RX ORDER — HYDROXYZINE HYDROCHLORIDE 25 MG/1
TABLET, FILM COATED ORAL
Refills: 0 | COMMUNITY
Start: 2019-03-30 | End: 2019-04-29

## 2019-04-29 RX ORDER — CICLOPIROX 80 MG/ML
SOLUTION TOPICAL
Qty: 6.6 ML | Refills: 0 | Status: SHIPPED | OUTPATIENT
Start: 2019-04-29 | End: 2019-07-07

## 2019-04-29 ASSESSMENT — PATIENT HEALTH QUESTIONNAIRE - PHQ9
SUM OF ALL RESPONSES TO PHQ QUESTIONS 1-9: 8
CLINICAL INTERPRETATION OF PHQ2 SCORE: 2
5. POOR APPETITE OR OVEREATING: 1 - SEVERAL DAYS

## 2019-04-29 NOTE — PROGRESS NOTES
Established Patient    Parmjit presents today with the following:    CC: Depression, anxiety, warts on shaft penis, onychomycosis    HPI: 33 year old male with a PMH of depression, anxiety presents to the clinic today as a new patient to establish and with multiple complaints at todays patient to establish. Patient's significant other is in attendance at todays visit.    Depression/anxiety: Patient has suffered from depression and generalized anxiety since he was a teenager and has been on multiple different treatment regimens over the years. He has been followed previously by therapists and was able to establish with a new one recently. He states that he does not feel like he has a good connection with his current therapist. He has been off medications for the past 7 years, but did briefly go back on antidepressants 4 years ago, but did not stay on it. He has tried multiple different SSRI's and was unhappy with the sexual side effects. He has tried wellbutrin with good success and would like to go back on that medication. Patient has a significant other, good support, a good job, but states that he has struggled with some meth and cocaine use. He does not currently use those substances. His diet is good and stays active with his job as a . He does have nithya attacks around once per week that have been quite bothersome to the patient. His current LENNY score today is 18.    Onychomycosis: Worse on the right than the left. Thick, brittle an yellow toenails for the past several years. He was seen previously by a doctor who told him that Vicks vapor rub helps get rid of the fungus and tried that, but does not feel like it helped.    HPV: Patient has warts on the shaft of his penis for the past several months. His significant other has had HPV as well and has undergone multiple conization procedures. He is interested in getting these lesions excised.      Patient Active Problem List    Diagnosis Date Noted   • Rash  "of face 11/14/2017   • Concern about STD in male without diagnosis 11/14/2017   • Male fertility problem 11/14/2017   • Nail deformity 11/14/2017   • Rhinorrhea 10/17/2017   • Acute seasonal allergic rhinitis due to pollen 10/17/2017   • Recurrent nephrolithiasis 09/11/2015   • History of tear of ACL (anterior cruciate ligament) 02/26/2015   • Left knee pain 02/26/2015   • History of methamphetamine abuse 02/26/2015       Current Outpatient Prescriptions   Medication Sig Dispense Refill   • hydrOXYzine HCl (ATARAX) 25 MG Tab TAKE 1-2 TABLET BY ORAL ROUTE UP TO 3 TIMES EVERY DAY AS NEEDED  0     No current facility-administered medications for this visit.        ROS: As per HPI. All others reviewed and were normal.    Constitutional: Denies fever, chills, night sweats  Eyes: Denies blurry vision, vision loss  ENT: Denies hearing loss, earache, odynophagia, dysphagia  Cardiovascular: Denies hypertension, murmur, angina, edema  Respiratory: Denies shortness of breath, wheeze, cough, sputum, hemoptysis  GI: Denies change in appetite, nausea, vomiting, diarrhea, constipation, blood in stool, abdominal pain  : Denies dysuria, hematuria  Musculo-skeletal: Denies redness, swelling, arthritis, gout  Skin: Denies rash, sores  Reports warts on shaft penis  Neurological: Denies loss of sensation, numbness, tingling, seizure  Psychological: Reports anxiety, depression    /63 (BP Location: Left arm, Patient Position: Sitting, BP Cuff Size: Adult)   Pulse 77   Temp 36.6 °C (97.9 °F) (Temporal)   Ht 1.836 m (6' 0.28\")   Wt 89.8 kg (198 lb)   SpO2 95%   BMI 26.64 kg/m²     Physical Exam   Constitutional:  oriented to person, place, and time. No distress.   Eyes: Pupils are equal, round, and reactive to light. No scleral icterus.  Neck: Neck supple. No thyromegaly present.   Cardiovascular: Normal rate, regular rhythm and normal heart sounds.  Exam reveals no gallop and no friction rub.  No murmur " heard.  Pulmonary/Chest: Breath sounds normal. Chest wall is not dull to percussion.   Musculoskeletal:   no edema.   Lymphadenopathy: no cervical adenopathy  Neurological: alert and oriented to person, place, and time.   Skin: No cyanosis. Nails show no clubbing.      Assessment and Plan    1) Depression/anxiety  - Since teenager  - Some drug use, strongly counseled cessation  - LENNY 18, check at next visit  - No real risk factors for medical cause of depression, consider checking TSH, B12  - Wanted to avoid meds with sexual side effects  - Initiate wellbutrin  - Psychology referral for CBT  - Follow up in 5 weeks    2) Panic attacks  - Around once per week  - Course of hydroxyzine  - Check frequency at next visit    3) Onychomycosis  - For the past several years  - Thick, yellow, brittle nails worse on left  - Referral to derm for treatment  - Try penlac in the meantime     4) HPV  - Warts on shaft of penis  - Usually clears infection in 7.5-12 months in young healthy males  - Referral to derm to treat    Signed by: Ac Nash M.D.

## 2019-04-30 RX ORDER — PROPRANOLOL HYDROCHLORIDE 40 MG/1
40 TABLET ORAL 2 TIMES DAILY PRN
Qty: 60 TAB | Refills: 3 | Status: SHIPPED | OUTPATIENT
Start: 2019-04-30 | End: 2019-04-30

## 2019-04-30 RX ORDER — HYDROXYZINE HYDROCHLORIDE 25 MG/1
25 TABLET, FILM COATED ORAL 3 TIMES DAILY PRN
Qty: 30 TAB | Refills: 0 | Status: SHIPPED | OUTPATIENT
Start: 2019-04-30 | End: 2019-07-07

## 2019-04-30 RX ORDER — BUPROPION HYDROCHLORIDE 150 MG/1
150 TABLET ORAL EVERY MORNING
Qty: 90 TAB | Refills: 3 | Status: SHIPPED | OUTPATIENT
Start: 2019-04-30

## 2019-07-07 ENCOUNTER — APPOINTMENT (OUTPATIENT)
Dept: RADIOLOGY | Facility: MEDICAL CENTER | Age: 34
End: 2019-07-07
Attending: EMERGENCY MEDICINE
Payer: COMMERCIAL

## 2019-07-07 ENCOUNTER — HOSPITAL ENCOUNTER (EMERGENCY)
Facility: MEDICAL CENTER | Age: 34
End: 2019-07-07
Attending: EMERGENCY MEDICINE
Payer: COMMERCIAL

## 2019-07-07 VITALS
SYSTOLIC BLOOD PRESSURE: 122 MMHG | HEART RATE: 77 BPM | HEIGHT: 73 IN | OXYGEN SATURATION: 96 % | BODY MASS INDEX: 26.03 KG/M2 | DIASTOLIC BLOOD PRESSURE: 66 MMHG | RESPIRATION RATE: 18 BRPM | WEIGHT: 196.43 LBS | TEMPERATURE: 98.1 F

## 2019-07-07 DIAGNOSIS — I80.9 SUPERFICIAL PHLEBITIS: ICD-10-CM

## 2019-07-07 LAB
ALBUMIN SERPL BCP-MCNC: 3.8 G/DL (ref 3.2–4.9)
ALBUMIN/GLOB SERPL: 1.5 G/DL
ALP SERPL-CCNC: 48 U/L (ref 30–99)
ALT SERPL-CCNC: 26 U/L (ref 2–50)
ANION GAP SERPL CALC-SCNC: 4 MMOL/L (ref 0–11.9)
AST SERPL-CCNC: 31 U/L (ref 12–45)
BASOPHILS # BLD AUTO: 0.7 % (ref 0–1.8)
BASOPHILS # BLD: 0.03 K/UL (ref 0–0.12)
BILIRUB SERPL-MCNC: 0.6 MG/DL (ref 0.1–1.5)
BUN SERPL-MCNC: 7 MG/DL (ref 8–22)
CALCIUM SERPL-MCNC: 8.4 MG/DL (ref 8.4–10.2)
CHLORIDE SERPL-SCNC: 106 MMOL/L (ref 96–112)
CO2 SERPL-SCNC: 27 MMOL/L (ref 20–33)
CREAT SERPL-MCNC: 0.95 MG/DL (ref 0.5–1.4)
CRP SERPL HS-MCNC: 0.05 MG/DL (ref 0–0.75)
EOSINOPHIL # BLD AUTO: 0.17 K/UL (ref 0–0.51)
EOSINOPHIL NFR BLD: 3.7 % (ref 0–6.9)
ERYTHROCYTE [DISTWIDTH] IN BLOOD BY AUTOMATED COUNT: 41.8 FL (ref 35.9–50)
ERYTHROCYTE [SEDIMENTATION RATE] IN BLOOD BY WESTERGREN METHOD: 9 MM/HOUR (ref 0–15)
GLOBULIN SER CALC-MCNC: 2.6 G/DL (ref 1.9–3.5)
GLUCOSE SERPL-MCNC: 100 MG/DL (ref 65–99)
HCT VFR BLD AUTO: 41.8 % (ref 42–52)
HGB BLD-MCNC: 14 G/DL (ref 14–18)
IMM GRANULOCYTES # BLD AUTO: 0.01 K/UL (ref 0–0.11)
IMM GRANULOCYTES NFR BLD AUTO: 0.2 % (ref 0–0.9)
INR PPP: 0.96 (ref 0.87–1.13)
LYMPHOCYTES # BLD AUTO: 1.45 K/UL (ref 1–4.8)
LYMPHOCYTES NFR BLD: 31.9 % (ref 22–41)
MCH RBC QN AUTO: 31.2 PG (ref 27–33)
MCHC RBC AUTO-ENTMCNC: 33.5 G/DL (ref 33.7–35.3)
MCV RBC AUTO: 93.1 FL (ref 81.4–97.8)
MONOCYTES # BLD AUTO: 0.55 K/UL (ref 0–0.85)
MONOCYTES NFR BLD AUTO: 12.1 % (ref 0–13.4)
NEUTROPHILS # BLD AUTO: 2.34 K/UL (ref 1.82–7.42)
NEUTROPHILS NFR BLD: 51.4 % (ref 44–72)
NRBC # BLD AUTO: 0 K/UL
NRBC BLD-RTO: 0 /100 WBC
PLATELET # BLD AUTO: 209 K/UL (ref 164–446)
PMV BLD AUTO: 10 FL (ref 9–12.9)
POTASSIUM SERPL-SCNC: 3.6 MMOL/L (ref 3.6–5.5)
PROT SERPL-MCNC: 6.4 G/DL (ref 6–8.2)
PROTHROMBIN TIME: 12.9 SEC (ref 12–14.6)
RBC # BLD AUTO: 4.49 M/UL (ref 4.7–6.1)
SODIUM SERPL-SCNC: 137 MMOL/L (ref 135–145)
WBC # BLD AUTO: 4.6 K/UL (ref 4.8–10.8)

## 2019-07-07 PROCEDURE — 93971 EXTREMITY STUDY: CPT | Mod: RT

## 2019-07-07 PROCEDURE — 85025 COMPLETE CBC W/AUTO DIFF WBC: CPT

## 2019-07-07 PROCEDURE — 86140 C-REACTIVE PROTEIN: CPT

## 2019-07-07 PROCEDURE — 80053 COMPREHEN METABOLIC PANEL: CPT

## 2019-07-07 PROCEDURE — 99284 EMERGENCY DEPT VISIT MOD MDM: CPT

## 2019-07-07 PROCEDURE — 85610 PROTHROMBIN TIME: CPT

## 2019-07-07 PROCEDURE — 36415 COLL VENOUS BLD VENIPUNCTURE: CPT

## 2019-07-07 PROCEDURE — 85652 RBC SED RATE AUTOMATED: CPT

## 2019-07-07 RX ORDER — CEPHALEXIN 500 MG/1
500 CAPSULE ORAL 4 TIMES DAILY
Qty: 28 CAP | Refills: 0 | Status: SHIPPED | OUTPATIENT
Start: 2019-07-07 | End: 2019-07-14

## 2019-07-07 RX ORDER — IBUPROFEN 600 MG/1
600 TABLET ORAL EVERY 8 HOURS PRN
Qty: 30 TAB | Refills: 0 | Status: SHIPPED | OUTPATIENT
Start: 2019-07-07 | End: 2020-01-27

## 2019-07-07 RX ORDER — NAPROXEN SODIUM 220 MG
440 TABLET ORAL PRN
Status: SHIPPED | COMMUNITY
End: 2020-01-27

## 2019-07-07 NOTE — ED NOTES
Med rec updated and complete  Allergies reviewed  Pt reports no antibiotics in the last 2 weeks.

## 2019-07-07 NOTE — ED NOTES
"Pt is complaining of arm pain and states that he \"gave blood a couple days ago\" and has a family Hx of blood clots. He states that he has had no other trauma to his arm   "

## 2019-07-07 NOTE — ED PROVIDER NOTES
ED Provider Note    CHIEF COMPLAINT   Chief Complaint   Patient presents with   • Arm Pain     R arm pain after giving blood on the 3rd. family hx of blood clots.        HPI   Parmjit Burgess is a 33 y.o. male who presents with complaint of increased pain and some redness to his upper arm for the past 2 days.  The patient says he had some blood work done on July 3 four days ago.  He noticed some pain to his armpit area along with some mild swelling.  He then developed some redness along the vein from the armpit down to the forearm.  He has been having pain over the dorsum of the forearm and to the posterior right shoulder.  He denies any numbness or tingling.  He has had no fever, shaking chills, sore throat, cough, congestion, any difficulty breathing.  No chest pain or abdominal pain.  He does have a family history of blood clots but he himself has never had one.    REVIEW OF SYSTEMS   See HPI for further details.     PAST MEDICAL HISTORY   Past Medical History:   Diagnosis Date   • ADD (attention deficit disorder) 2006   • Depression age 15   • Depression    • Kidney stone 2005    lithotripsy   • OCD (obsessive compulsive disorder) 2014    seeks perfection       FAMILY HISTORY  Family History   Problem Relation Age of Onset   • Stroke Maternal Grandmother    • Heart Disease Maternal Grandfather    • Alcohol/Drug Maternal Grandfather    • Cancer Neg Hx    • Diabetes Neg Hx        SOCIAL HISTORY  Social History     Social History   • Marital status: Single     Spouse name: N/A   • Number of children: N/A   • Years of education: N/A     Social History Main Topics   • Smoking status: Never Smoker   • Smokeless tobacco: Never Used   • Alcohol use 0.0 oz/week      Comment: Occassionally   • Drug use: No      Comment: No meth  Dec 2014   • Sexual activity: Yes     Partners: Female      Comment: single     Other Topics Concern   • Not on file     Social History Narrative    ** Merged History Encounter **       "      SURGICAL HISTORY  Past Surgical History:   Procedure Laterality Date   • ACL RECONSTRUCTION  10/31/13    Left, redo cadaver graft 6/24/15 Dr Vizcaino   • RADIUS ULNA ORIF  2010    L side, cage fighting       CURRENT MEDICATIONS   Home Medications     Reviewed by Meme Monsivais (Pharmacy Tech) on 07/07/19 at 0958  Med List Status: Complete   Medication Last Dose Status   buPROPion (WELLBUTRIN XL) 150 MG XL tablet 7/7/2019 Active   Cholecalciferol ([employer] VITAMIN D3) 57685 UNIT Cap 7/7/2019 Active   multivitamin (THERAGRAN) Tab 7/7/2019 Active   naproxen (ALEVE) 220 MG tablet 7/6/2019 Active                ALLERGIES   Allergies   Allergen Reactions   • Sulfa Drugs Rash     Pt states he breaks out into a rash   • Abilify      Severe anxiety and stimulation       PHYSICAL EXAM  VITAL SIGNS: /66   Pulse 77   Temp 36.7 °C (98.1 °F) (Temporal)   Resp 18   Ht 1.854 m (6' 1\")   Wt 89.1 kg (196 lb 6.9 oz)   SpO2 96%   BMI 25.92 kg/m²   Constitutional: Well developed, Well nourished, No acute distress, Non-toxic appearance.   Extremities: Exam is focused on the right upper extremity.  There is tenderness to the right axilla with a area of redness along the vessel with streaking extending down from the volar upper arm to the antecubital fossa where he had the IV previously and extending over the dorsum of the forearm.  There is tenderness over the dorsum of the forearm without significant swelling.  There is a good radial pulse, good sensation to fingertips, good cap refill.  There is good movement of the fingers and upper arm without pain.       RADIOLOGY/PROCEDURES  US-EXTREMITY VENOUS UPPER UNILAT RIGHT   Final Result         1. No evidence of right upper extremity deep venous thrombosis.            COURSE & MEDICAL DECISION MAKING  Pertinent Labs & Imaging studies reviewed. (See chart for details)  The patient presents with the above complaints.  He declines any pain medication.  Ultrasound of the " right upper extremity was negative for DVT.  The patient appears to have a superficial phlebitis.  He will be placed on ibuprofen 600 mg 3 times a day along with Keflex.  He is told to use warm compresses, return to the ER for worsening pain, redness, swelling, fever, or any other problems.  He is to follow-up with his PCP and call the office tomorrow.    FINAL IMPRESSION  1.  Superficial phlebitis to the right arm  2.   3.      Electronically signed by: Dylan Alvarez, 7/7/2019 10:01 AM

## 2019-07-07 NOTE — ED NOTES
All lines remove, all discharge instructions and prescriptions discussed, all questions answered, discharged with spouse.

## 2019-07-07 NOTE — ED TRIAGE NOTES
"Chief Complaint   Patient presents with   • Arm Pain     R arm pain after giving blood on the 3rd. family hx of blood clots.      /66   Pulse 77   Temp 36.7 °C (98.1 °F) (Temporal)   Resp 18   Ht 1.854 m (6' 1\")   Wt 89.1 kg (196 lb 6.9 oz)   SpO2 96%   BMI 25.92 kg/m²     "

## 2019-07-25 ENCOUNTER — HOSPITAL ENCOUNTER (OUTPATIENT)
Facility: MEDICAL CENTER | Age: 34
End: 2019-07-25
Attending: NURSE PRACTITIONER
Payer: COMMERCIAL

## 2019-07-25 ENCOUNTER — OFFICE VISIT (OUTPATIENT)
Dept: URGENT CARE | Facility: CLINIC | Age: 34
End: 2019-07-25
Payer: COMMERCIAL

## 2019-07-25 VITALS
HEART RATE: 84 BPM | TEMPERATURE: 97.6 F | SYSTOLIC BLOOD PRESSURE: 110 MMHG | OXYGEN SATURATION: 98 % | DIASTOLIC BLOOD PRESSURE: 72 MMHG | HEIGHT: 73 IN | RESPIRATION RATE: 15 BRPM | BODY MASS INDEX: 25.98 KG/M2 | WEIGHT: 196 LBS

## 2019-07-25 DIAGNOSIS — Z87.442 HISTORY OF KIDNEY STONES: ICD-10-CM

## 2019-07-25 DIAGNOSIS — R31.9 HEMATURIA, UNSPECIFIED TYPE: ICD-10-CM

## 2019-07-25 DIAGNOSIS — R30.0 DYSURIA: ICD-10-CM

## 2019-07-25 PROCEDURE — 99213 OFFICE O/P EST LOW 20 MIN: CPT | Performed by: NURSE PRACTITIONER

## 2019-07-25 PROCEDURE — 87086 URINE CULTURE/COLONY COUNT: CPT

## 2019-07-25 RX ORDER — PROPRANOLOL HYDROCHLORIDE 40 MG/1
40 TABLET ORAL
Refills: 2 | COMMUNITY
Start: 2019-05-26 | End: 2020-01-27

## 2019-07-26 ENCOUNTER — HOSPITAL ENCOUNTER (OUTPATIENT)
Dept: RADIOLOGY | Facility: MEDICAL CENTER | Age: 34
End: 2019-07-26
Attending: NURSE PRACTITIONER
Payer: COMMERCIAL

## 2019-07-26 DIAGNOSIS — Z87.442 HISTORY OF KIDNEY STONES: ICD-10-CM

## 2019-07-26 DIAGNOSIS — R31.9 HEMATURIA, UNSPECIFIED TYPE: ICD-10-CM

## 2019-07-26 PROCEDURE — 74176 CT ABD & PELVIS W/O CONTRAST: CPT

## 2019-07-26 ASSESSMENT — ENCOUNTER SYMPTOMS
NAUSEA: 0
ABDOMINAL PAIN: 0
CHILLS: 0
BACK PAIN: 0
NECK PAIN: 0
FLANK PAIN: 0
FEVER: 0
TINGLING: 0

## 2019-07-26 ASSESSMENT — LIFESTYLE VARIABLES: SUBSTANCE_ABUSE: 0

## 2019-07-26 NOTE — PROGRESS NOTES
"Subjective:      Parmjit Burgess is a 33 y.o. male who presents with UTI    Reviewed past medical, surgical and family history. Reviewed prescription and OTC medications with patient in electronic health record today.     Allergies   Allergen Reactions   • Sulfa Drugs Rash     Pt states he breaks out into a rash   • Abilify      Severe anxiety and stimulation             HPI This is a new problem. Parmjit is a 34 y/o male pt with c/o dysuria. + Hx of kidney stones and lithotripsy a few years ago. Symptoms are slowly getting worse. Feeling like his bladder is not completely emptying. Mild burning. Denies back , flank or groin pain. Denies fever, chills, penile discharge, nocturia. He is sexually active in monogamous relationship with his wife.  Treatment tried: Increased fluids. No other aggravating or alleviating factors.        Review of Systems   Constitutional: Negative for chills and fever.   Gastrointestinal: Negative for abdominal pain and nausea.   Genitourinary: Positive for dysuria. Negative for flank pain, frequency, hematuria and urgency.   Musculoskeletal: Negative for back pain and neck pain.   Neurological: Negative for tingling.   Endo/Heme/Allergies: Negative for environmental allergies.   Psychiatric/Behavioral: Negative for substance abuse.          Objective:     /72   Pulse 84   Temp 36.4 °C (97.6 °F) (Temporal)   Resp 15   Ht 1.854 m (6' 1\")   Wt 88.9 kg (196 lb)   SpO2 98%   BMI 25.86 kg/m²      Physical Exam   Constitutional: He is oriented to person, place, and time. He appears well-developed and well-nourished.   HENT:   Head: Normocephalic.   Neck: Normal range of motion. Neck supple.   Cardiovascular: Normal rate and regular rhythm.    Pulmonary/Chest: Effort normal.   Abdominal: Soft. He exhibits no distension and no mass. There is no tenderness. There is no rebound and no guarding. No hernia.   Genitourinary:   Genitourinary Comments: Deferred exam     Neurological: " He is alert and oriented to person, place, and time.   Skin: Skin is warm. Capillary refill takes less than 2 seconds.   Psychiatric: He has a normal mood and affect. His behavior is normal.   Nursing note and vitals reviewed.       UA: + heme, trace leuk        Assessment/Plan:     1. Hematuria, unspecified type  CT-RENAL COLIC EVALUATION(A/P W/O)    REFERRAL TO UROLOGY    URINE CULTURE(NEW)   2. History of kidney stones  CT-RENAL COLIC EVALUATION(A/P W/O)    REFERRAL TO UROLOGY    URINE CULTURE(NEW)         Keep well hydrated.     Renal CT scheduled for tomorrow.   Keep well hydrated  Referral Urology for FU     Return to clinic or PCP prn  if current symptoms are not resolving in a satisfactory manner or sooner if new or worsening symptoms occur.   Patient was advised of signs and symptoms which would warrant further evaluation and /or emergent evaluation in ER.  Verbalized agreement with this treatment plan and seemed to understand without barriers. Questions were encouraged and answered to patients satisfaction.     Aftercare instructions were given to pt

## 2019-07-28 LAB
BACTERIA UR CULT: NORMAL
SIGNIFICANT IND 70042: NORMAL
SITE SITE: NORMAL
SOURCE SOURCE: NORMAL

## 2020-01-27 ENCOUNTER — OFFICE VISIT (OUTPATIENT)
Dept: MEDICAL GROUP | Facility: MEDICAL CENTER | Age: 35
End: 2020-01-27
Payer: COMMERCIAL

## 2020-01-27 VITALS
SYSTOLIC BLOOD PRESSURE: 112 MMHG | BODY MASS INDEX: 28.93 KG/M2 | RESPIRATION RATE: 16 BRPM | DIASTOLIC BLOOD PRESSURE: 78 MMHG | WEIGHT: 218.26 LBS | OXYGEN SATURATION: 99 % | HEIGHT: 73 IN | HEART RATE: 74 BPM | TEMPERATURE: 98.2 F

## 2020-01-27 DIAGNOSIS — J30.1 ACUTE SEASONAL ALLERGIC RHINITIS DUE TO POLLEN: ICD-10-CM

## 2020-01-27 DIAGNOSIS — M79.641 RIGHT HAND PAIN: ICD-10-CM

## 2020-01-27 DIAGNOSIS — L60.8 NAIL DEFORMITY: ICD-10-CM

## 2020-01-27 PROBLEM — N46.9 MALE FERTILITY PROBLEM: Status: RESOLVED | Noted: 2017-11-14 | Resolved: 2020-01-27

## 2020-01-27 PROBLEM — J34.89 RHINORRHEA: Status: RESOLVED | Noted: 2017-10-17 | Resolved: 2020-01-27

## 2020-01-27 PROCEDURE — 99214 OFFICE O/P EST MOD 30 MIN: CPT | Performed by: PHYSICIAN ASSISTANT

## 2020-01-27 RX ORDER — LAMOTRIGINE 200 MG/1
200 TABLET ORAL DAILY
COMMUNITY

## 2020-01-27 ASSESSMENT — PATIENT HEALTH QUESTIONNAIRE - PHQ9: CLINICAL INTERPRETATION OF PHQ2 SCORE: 0

## 2020-01-27 NOTE — LETTER
January 27, 2020         Patient: Parmjit Burgess   YOB: 1985   Date of Visit: 1/27/2020           To Whom it May Concern:    Parmjit Burgess was seen in my clinic on 1/27/2020. He may return to work without restrictions on 1/28/2020.    If you have any questions or concerns, please don't hesitate to call.        Sincerely,           Parmjit Pat P.A.-C.  Electronically Signed

## 2020-01-28 NOTE — PROGRESS NOTES
Subjective:   Parmjit Burgess is a 34 y.o. male here today for right hand pain for approximately 3 days status post trauma, chronic history of nail deformity and allergies.    Right hand pain  This is a 34-year-old male who is here today to discuss his right hand which has some limited pain secondary to hitting something.  He is a .  His supervisor is advising him to get a letter to return to work without restrictions.  Denies any significant pain.  There is a slight deformity but he is not concerned.  He is not interested in any follow-up even if there is a fracture.    Nail deformity  Chronic condition.  States that the nail is problematic at times.  If he stubs it then it will become black and blue.  It is deformed and he would like to have it possibly taken off.  The past he thought it was a fungal infection.    Acute seasonal allergic rhinitis due to pollen  Chronic condition.  Saw allergist and is a candidate for immunoglobulin injections.  Cannot find the time to do it.       Current medicines (including changes today)  Current Outpatient Medications   Medication Sig Dispense Refill   • lamotrigine (LAMICTAL) 200 MG tablet Take 200 mg by mouth every day.     • Cholecalciferol ([employer] VITAMIN D3) 96254 UNIT Cap Take 10,000 Units by mouth every day.     • multivitamin (THERAGRAN) Tab Take 1 Tab by mouth every day.     • buPROPion (WELLBUTRIN XL) 150 MG XL tablet Take 1 Tab by mouth every morning. 90 Tab 3     No current facility-administered medications for this visit.      He  has a past medical history of ADD (attention deficit disorder) (2006), Depression (age 15), Depression, Kidney stone (2005), and OCD (obsessive compulsive disorder) (2014).    Social History and Family History were reviewed and updated.    ROS   No chest pain, no shortness of breath, no abdominal pain and all other systems were reviewed and are negative.       Objective:     /78 (BP Location: Left arm, Patient Position:  "Sitting, BP Cuff Size: Adult)   Pulse 74   Temp 36.8 °C (98.2 °F) (Temporal)   Resp 16   Ht 1.854 m (6' 1\")   Wt 99 kg (218 lb 4.1 oz)   SpO2 99%  Body mass index is 28.8 kg/m².   Physical Exam:  Constitutional: Alert, no distress.  Skin: Warm, dry, good turgor, no rashes in visible areas.  Eye: Equal, round and reactive, conjunctiva clear, lids normal.  ENMT: Lips without lesions, good dentition, oropharynx clear.  Neck: Trachea midline, no masses.   Lymph: No cervical or supraclavicular lymphadenopathy  Respiratory: Unlabored respiratory effort, lungs appear clear, no wheezes.  Cardiovascular: Regular rate and rhythm.  Musculoskeletal: Right hand with notable fifth metacarpal distal deformity of being elevated.  Psych: Alert and oriented x3, normal affect and mood.        Assessment and Plan:   The following treatment plan was discussed    1. Right hand pain  Acute, new onset condition.  Likely boxer's fracture.  He is doing well though performing his work duties as well as other ADLs.  I do not see any reason why he should not continue to work without restrictions.  Provided him a letter.  Also ordered a hand x-ray.  Discussed possible concerns if not treated appropriately.  Long-term effects.  He may walk into 1 of our imaging facilities for the x-ray.  - DX-HAND 2- RIGHT; Future    2. Acute seasonal allergic rhinitis due to pollen  Chronic condition.  Stable.  Follow with allergist when ready for injections.    3. Nail deformity  Chronic condition.  Unknown if a true tinea infection.  Refer to podiatry for evaluation and treatment.  - REFERRAL TO PODIATRY      Followup: Return if symptoms worsen or fail to improve.    Please note that this dictation was created using voice recognition software. I have made every reasonable attempt to correct obvious errors, but I expect that there are errors of grammar and possibly content that I did not discover before finalizing the note.           "

## 2020-01-28 NOTE — ASSESSMENT & PLAN NOTE
Chronic condition.  Saw allergist and is a candidate for immunoglobulin injections.  Cannot find the time to do it.

## 2020-01-28 NOTE — ASSESSMENT & PLAN NOTE
Chronic condition.  States that the nail is problematic at times.  If he stubs it then it will become black and blue.  It is deformed and he would like to have it possibly taken off.  The past he thought it was a fungal infection.

## 2020-01-28 NOTE — ASSESSMENT & PLAN NOTE
This is a 34-year-old male who is here today to discuss his right hand which has some limited pain secondary to hitting something.  He is a .  His supervisor is advising him to get a letter to return to work without restrictions.  Denies any significant pain.  There is a slight deformity but he is not concerned.  He is not interested in any follow-up even if there is a fracture.

## 2020-02-06 ENCOUNTER — HOSPITAL ENCOUNTER (OUTPATIENT)
Dept: RADIOLOGY | Facility: MEDICAL CENTER | Age: 35
End: 2020-02-06
Attending: PHYSICIAN ASSISTANT
Payer: COMMERCIAL

## 2020-02-06 DIAGNOSIS — M79.641 RIGHT HAND PAIN: ICD-10-CM

## 2020-02-06 PROCEDURE — 73120 X-RAY EXAM OF HAND: CPT | Mod: RT

## 2020-02-10 DIAGNOSIS — S62.326P: ICD-10-CM

## 2020-10-03 ENCOUNTER — HOSPITAL ENCOUNTER (OUTPATIENT)
Dept: RADIOLOGY | Facility: MEDICAL CENTER | Age: 35
End: 2020-10-03
Attending: NURSE PRACTITIONER
Payer: MEDICAID

## 2020-10-03 ENCOUNTER — TELEPHONE (OUTPATIENT)
Dept: URGENT CARE | Facility: CLINIC | Age: 35
End: 2020-10-03

## 2020-10-03 ENCOUNTER — OFFICE VISIT (OUTPATIENT)
Dept: URGENT CARE | Facility: CLINIC | Age: 35
End: 2020-10-03
Payer: MEDICAID

## 2020-10-03 VITALS
WEIGHT: 190 LBS | SYSTOLIC BLOOD PRESSURE: 120 MMHG | HEART RATE: 75 BPM | BODY MASS INDEX: 25.18 KG/M2 | HEIGHT: 73 IN | OXYGEN SATURATION: 95 % | TEMPERATURE: 96.9 F | DIASTOLIC BLOOD PRESSURE: 70 MMHG

## 2020-10-03 DIAGNOSIS — R10.9 FLANK PAIN: ICD-10-CM

## 2020-10-03 DIAGNOSIS — Z87.442 HISTORY OF KIDNEY STONES: ICD-10-CM

## 2020-10-03 LAB
APPEARANCE UR: CLEAR
BILIRUB UR STRIP-MCNC: NORMAL MG/DL
COLOR UR AUTO: YELLOW
GLUCOSE UR STRIP.AUTO-MCNC: NORMAL MG/DL
KETONES UR STRIP.AUTO-MCNC: NORMAL MG/DL
LEUKOCYTE ESTERASE UR QL STRIP.AUTO: NORMAL
NITRITE UR QL STRIP.AUTO: NORMAL
PH UR STRIP.AUTO: 7 [PH] (ref 5–8)
PROT UR QL STRIP: NORMAL MG/DL
RBC UR QL AUTO: NORMAL
SP GR UR STRIP.AUTO: 1.01
UROBILINOGEN UR STRIP-MCNC: NORMAL MG/DL

## 2020-10-03 PROCEDURE — 99214 OFFICE O/P EST MOD 30 MIN: CPT | Performed by: NURSE PRACTITIONER

## 2020-10-03 PROCEDURE — 74176 CT ABD & PELVIS W/O CONTRAST: CPT

## 2020-10-03 PROCEDURE — 81002 URINALYSIS NONAUTO W/O SCOPE: CPT | Performed by: NURSE PRACTITIONER

## 2020-10-03 RX ORDER — IPRATROPIUM BROMIDE 21 UG/1
SPRAY, METERED NASAL
COMMUNITY
Start: 2020-10-01

## 2020-10-03 RX ORDER — BUPROPION HYDROCHLORIDE 300 MG/1
TABLET ORAL
COMMUNITY
Start: 2020-09-19

## 2020-10-03 RX ORDER — DIAZEPAM 5 MG/1
TABLET ORAL
COMMUNITY
Start: 2020-09-29

## 2020-10-03 RX ORDER — AZELASTINE 1 MG/ML
SPRAY, METERED NASAL
COMMUNITY
Start: 2020-10-01

## 2020-10-03 RX ORDER — ESCITALOPRAM OXALATE 10 MG/1
TABLET ORAL
COMMUNITY
Start: 2020-09-24

## 2020-10-03 ASSESSMENT — ENCOUNTER SYMPTOMS
FLANK PAIN: 1
NAUSEA: 0
FEVER: 0
ABDOMINAL PAIN: 0
VOMITING: 0
CHILLS: 0
DIZZINESS: 0
HEADACHES: 0
DIARRHEA: 0

## 2020-10-03 ASSESSMENT — FIBROSIS 4 INDEX: FIB4 SCORE: 0.96

## 2020-10-03 NOTE — TELEPHONE ENCOUNTER
Called and left detailed voicemail informing patient of bilateral tiny renal calculi.  Advised that I am not concerned about his kidney function as there does not appear to be any issues with his kidney.  Advised patient that I will place a urology referral and he should follow-up with him as soon as he can get an appointment.       Please note that this dictation was created using voice recognition software. I have made every reasonable attempt to correct obvious errors, but I expect that there are errors of grammar and possibly content that I did not discover before finalizing the note.    Note electronically signed by ATIYA Valenzuela

## 2020-10-03 NOTE — PROGRESS NOTES
Subjective:   Parmjit Burgess  is a 34 y.o. male who presents for Low Back Pain (Pt complains of low back pain, Hx of kidney stones.)        HPI   Patient is a 34-year-old nontoxic-appearing male coming to urgent care for problem has been going on for about 6 months.  Has a personal history of kidney stones and states this feels very similar is getting to the point where he has passed a couple in the last month and would like a referral to urology due to increased pain and frequency of the kidney stones.  Patient denies fever, chills, nausea or vomiting denies any abdominal pain.  Has not taken anything over-the-counter for his symptoms.  Patient states he also has a problem initiating urinary stream.  Has not followed up with urology in the last 4 years and needs a referral.    Review of Systems   Constitutional: Negative for chills, fever and malaise/fatigue.   Gastrointestinal: Negative for abdominal pain, diarrhea, nausea and vomiting.   Genitourinary: Positive for flank pain and hematuria. Negative for dysuria, frequency and urgency.   Neurological: Negative for dizziness and headaches.   All other systems reviewed and are negative.    Past Medical History:   Diagnosis Date   • ADD (attention deficit disorder) 2006   • Depression age 15   • Depression    • Kidney stone 2005    lithotripsy   • OCD (obsessive compulsive disorder) 2014    seeks perfection      Past Surgical History:   Procedure Laterality Date   • ACL RECONSTRUCTION  10/31/13    Left, redo cadaver graft 6/24/15 Dr Vizcaino   • RADIUS ULNA ORIF  2010    L side, cage fighting      Social History     Socioeconomic History   • Marital status:      Spouse name: Not on file   • Number of children: Not on file   • Years of education: Not on file   • Highest education level: Not on file   Occupational History   • Not on file   Social Needs   • Financial resource strain: Not on file   • Food insecurity     Worry: Not on file     Inability: Not  "on file   • Transportation needs     Medical: Not on file     Non-medical: Not on file   Tobacco Use   • Smoking status: Never Smoker   • Smokeless tobacco: Never Used   Substance and Sexual Activity   • Alcohol use: Yes     Alcohol/week: 0.0 oz     Comment: Occassionally   • Drug use: No     Comment: No meth  Dec 2014   • Sexual activity: Yes     Partners: Female     Comment: single   Lifestyle   • Physical activity     Days per week: Not on file     Minutes per session: Not on file   • Stress: Not on file   Relationships   • Social connections     Talks on phone: Not on file     Gets together: Not on file     Attends [employer] service: Not on file     Active member of club or organization: Not on file     Attends meetings of clubs or organizations: Not on file     Relationship status: Not on file   • Intimate partner violence     Fear of current or ex partner: Not on file     Emotionally abused: Not on file     Physically abused: Not on file     Forced sexual activity: Not on file   Other Topics Concern   • Not on file   Social History Narrative    ** Merged History Encounter **         Sulfa drugs and Abilify       Objective:   /70   Temp 36.1 °C (96.9 °F)   Ht 1.854 m (6' 1\")   Wt 86.2 kg (190 lb)   BMI 25.07 kg/m²   Physical Exam  Vitals signs reviewed.   Constitutional:       Appearance: Normal appearance.   Cardiovascular:      Rate and Rhythm: Normal rate and regular rhythm.      Heart sounds: Normal heart sounds.   Pulmonary:      Effort: Pulmonary effort is normal.      Breath sounds: Normal breath sounds.   Abdominal:      General: There is no distension.      Tenderness: There is no abdominal tenderness. There is no right CVA tenderness, left CVA tenderness or guarding.   Skin:     General: Skin is warm.   Neurological:      Mental Status: He is alert and oriented to person, place, and time.   Psychiatric:         Mood and Affect: Mood normal.         Behavior: Behavior normal.         Thought " Content: Thought content normal.         Judgment: Judgment normal.       Lab Results   Component Value Date/Time    POCCOLOR Yellow 10/03/2020 09:04 AM    POCAPPEAR Clear 10/03/2020 09:04 AM    POCLEUKEST Neg 10/03/2020 09:04 AM    POCNITRITE Neg 10/03/2020 09:04 AM    POCUROBILIGE Neg 10/03/2020 09:04 AM    POCPROTEIN Neg 10/03/2020 09:04 AM    POCURPH 7.0 10/03/2020 09:04 AM    POCBLOOD Trace 10/03/2020 09:04 AM    POCSPGRV 1.015 10/03/2020 09:04 AM    POCKETONES Neg 10/03/2020 09:04 AM    POCBILIRUBIN Neg 10/03/2020 09:04 AM    POCGLUCUA Neg 10/03/2020 09:04 AM            Assessment/Plan:     1. Flank pain  - POCT Urinalysis - heme lysed   - CT-RENAL COLIC EVALUATION(A/P W/O); Future    2. History of kidney stones  - POCT Urinalysis  - CT-RENAL COLIC EVALUATION(A/P W/O); Future    Discussed physical examination findings as well as urinalysis in clinic and patient presentation may be consistent with kidney stones.  Unable to rule out hydronephrosis without CT scan so will order CT scan and have it done outpatient and send referral to urology for further work-up and evaluation due to bilateral flank pain as well as frequent kidney stones.  Advised patient that urinalysis does not indicate an infection at this time so treatment is not indicated.  Discussed abortive care including over-the-counter NSAIDs, Tylenol per 's instructions as well as increase fluids    Outpatient CT scheduled today at 0945 at [facility].  Will call with results and changes to plan of care, if indicated by scan     Supportive care, differential diagnoses, and indications for immediate follow-up discussed with patient.    Pathogenesis of diagnosis discussed including typical length and natural progression. Patient expresses understanding and agrees to plan.    Instructed patient to return to clinic for worsening symptoms or symptoms that persist for 7 to 10 days     Please note that this dictation was created using voice  recognition software. I have made every reasonable attempt to correct obvious errors, but I expect that there are errors of grammar and possibly content that I did not discover before finalizing the note.    Note electronically signed by ATIYA Valenzuela